# Patient Record
Sex: FEMALE | Race: NATIVE HAWAIIAN OR OTHER PACIFIC ISLANDER
[De-identification: names, ages, dates, MRNs, and addresses within clinical notes are randomized per-mention and may not be internally consistent; named-entity substitution may affect disease eponyms.]

---

## 2018-04-23 ENCOUNTER — HOSPITAL ENCOUNTER (EMERGENCY)
Dept: HOSPITAL 14 - H.ER | Age: 38
Discharge: HOME | End: 2018-04-23
Payer: COMMERCIAL

## 2018-04-23 VITALS
OXYGEN SATURATION: 98 % | SYSTOLIC BLOOD PRESSURE: 128 MMHG | TEMPERATURE: 97 F | DIASTOLIC BLOOD PRESSURE: 76 MMHG | RESPIRATION RATE: 18 BRPM | HEART RATE: 78 BPM

## 2018-04-23 VITALS — BODY MASS INDEX: 22.1 KG/M2

## 2018-04-23 DIAGNOSIS — M79.1: Primary | ICD-10-CM

## 2018-04-23 DIAGNOSIS — J45.909: ICD-10-CM

## 2018-04-23 NOTE — ED PDOC
HPI: Back


Time Seen by Provider: 04/23/18 09:45


Chief Complaint (Nursing): Back Pain


Chief Complaint (Provider): Back pain


History Per: Patient


History/Exam Limitations: no limitations


Onset/Duration Of Symptoms: Days (x2 weeks)


Current Symptoms Are (Timing): Still Present


Quality Of Discomfort: "Pain"


Associated Symptoms: Other (cough)


Additional Complaint(s): 





Allyson Cannon is a 37 year old female, with a past medical history of asthma, 

who was brought to the emergency department by EMS complaining of intermittent 

mid back pain, radiating across back onset for x2 weeks but progressively worst 

this morning. Patient has been coughing secondary to asthma and attributed her 

pain to persistent coughing. She denies any shortness of breath, fever, chills, 

heavy lifting, no injury or trauma. No further medical complaints.





PMD: None provided. 





Past Medical History


Reviewed: Historical Data, Nursing Documentation, Vital Signs


Vital Signs: 


 Last Vital Signs











Temp  97.3 F L  04/23/18 09:04


 


Pulse  81   04/23/18 09:04


 


Resp  20   04/23/18 09:04


 


BP  111/76   04/23/18 09:04


 


Pulse Ox  100   04/23/18 09:04














- Medical History


PMH: Asthma





- Surgical History


Surgical History: No Surg Hx





- Family History


Family History: States: No Known Family Hx





- Social History


Current smoker - smoking cessation education provided: No


Alcohol: None


Drugs: Denies





- Home Medications


Home Medications: 


 Ambulatory Orders











 Medication  Instructions  Recorded


 


Cyclobenzaprine [Cyclobenzaprine 10 mg PO Q8 #10 tab 04/23/18





HCl]  


 


Naproxen [Naprosyn] 500 mg PO Q12H #20 tab 04/23/18














- Allergies


Allergies/Adverse Reactions: 


 Allergies











Allergy/AdvReac Type Severity Reaction Status Date / Time


 


No Known Allergies Allergy   Verified 04/23/18 09:22














Review of Systems


ROS Statement: Except As Marked, All Systems Reviewed And Found Negative


Constitutional: Negative for: Fever, Chills, Other (trauma or heavy lifting)


Respiratory: Positive for: Cough.  Negative for: Shortness of Breath


Musculoskeletal: Positive for: Back Pain (mid)





Physical Exam





- Reviewed


Nursing Documentation Reviewed: Yes


Vital Signs Reviewed: Yes





- Physical Exam


Appears: Positive for: Non-toxic, No Acute Distress


Head Exam: Positive for: ATRAUMATIC, NORMOCEPHALIC


Skin: Positive for: Normal Color, Warm, Dry


Eye Exam: Positive for: Normal appearance, EOMI, PERRL


Neck: Positive for: Painless ROM


Cardiovascular/Chest: Positive for: Regular Rate, Rhythm.  Negative for: Murmur


Respiratory: Positive for: Normal Breath Sounds (equal breath sounds b/l and 

clear to auscultation).  Negative for: Wheezing, Respiratory Distress


Gastrointestinal/Abdominal: Positive for: Normal Exam, Soft.  Negative for: 

Tenderness


Back: Positive for: Other (b/l parathoracic tenderness and spasm. No midline 

spinal tenderness).  Negative for: Vertebral Tenderness


Extremity: Positive for: Normal ROM (all extremities).  Negative for: Deformity

, Swelling


Neurologic/Psych: Positive for: Alert, Oriented.  Negative for: Motor/Sensory 

Deficits (no focal deficits)





- ECG


O2 Sat by Pulse Oximetry: 100 (RA)


Pulse Ox Interpretation: Normal





Medical Decision Making


Medical Decision Making: 





Initial Plan:





--Urine pregnancy


--Urine dipstick


--Chest two views (PA/LAT) [RAD]


--Naproxen 500 mg PO 


--Reevaluation











--------------------------------------------------------------------------------

-----------------


Scribe Attestation:


Documented by Eliseo Rivera, acting as a scribe for Branden Junior MD





Provider Scribe Attestation:


All medical record entries made by the Scribe were at my direction and 

personally dictated by me. I have reviewed the chart and agree that the record 

accurately reflects my personal performance of the history, physical exam, 

medical decision making, and the department course for this patient. I have 

also personally directed, reviewed, and agree with the discharge instructions 

and disposition.





Disposition





- Clinical Impression


Clinical Impression: 


 Musculoskeletal pain








- Patient ED Disposition


Is Patient to be Admitted: No


Counseled Patient/Family Regarding: Studies Performed, Diagnosis, Need For 

Followup, Rx Given





- Disposition


Referrals: 


[facility] [Outside]


Disposition: Routine/Home


Disposition Time: 10:29


Condition: FAIR


Prescriptions: 


Cyclobenzaprine [Cyclobenzaprine HCl] 10 mg PO Q8 #10 tab


Naproxen [Naprosyn] 500 mg PO Q12H #20 tab


Instructions:  Muscle Strain (DC)


Forms:  [facility] (English)

## 2018-04-23 NOTE — RAD
HISTORY:

no  



COMPARISON:

No prior.



TECHNIQUE:

Chest PA and lateral



FINDINGS:



LUNGS:

No active pulmonary disease.



PLEURA:

No significant pleural effusion identified. No pneumothorax apparent.



CARDIOVASCULAR:

Normal.



OSSEOUS STRUCTURES:

No significant abnormalities.



VISUALIZED UPPER ABDOMEN:

Normal.



OTHER FINDINGS:

None.



IMPRESSION:

No active disease.